# Patient Record
Sex: MALE | NOT HISPANIC OR LATINO
[De-identification: names, ages, dates, MRNs, and addresses within clinical notes are randomized per-mention and may not be internally consistent; named-entity substitution may affect disease eponyms.]

---

## 2021-05-21 PROBLEM — Z00.00 ENCOUNTER FOR PREVENTIVE HEALTH EXAMINATION: Status: ACTIVE | Noted: 2021-05-21

## 2021-05-27 ENCOUNTER — NON-APPOINTMENT (OUTPATIENT)
Age: 41
End: 2021-05-27

## 2021-05-27 ENCOUNTER — APPOINTMENT (OUTPATIENT)
Dept: GASTROENTEROLOGY | Facility: CLINIC | Age: 41
End: 2021-05-27
Payer: MEDICAID

## 2021-05-27 VITALS
OXYGEN SATURATION: 97 % | BODY MASS INDEX: 24.64 KG/M2 | HEART RATE: 109 BPM | DIASTOLIC BLOOD PRESSURE: 78 MMHG | RESPIRATION RATE: 15 BRPM | WEIGHT: 192 LBS | HEIGHT: 74 IN | TEMPERATURE: 97.2 F | SYSTOLIC BLOOD PRESSURE: 109 MMHG

## 2021-05-27 DIAGNOSIS — K64.9 UNSPECIFIED HEMORRHOIDS: ICD-10-CM

## 2021-05-27 PROCEDURE — 99205 OFFICE O/P NEW HI 60 MIN: CPT

## 2021-05-27 RX ORDER — HYDROCORTISONE ACETATE 25 MG/1
25 SUPPOSITORY RECTAL
Qty: 15 | Refills: 1 | Status: ACTIVE | COMMUNITY
Start: 2021-05-27 | End: 1900-01-01

## 2021-05-28 LAB
ALBUMIN SERPL ELPH-MCNC: 4.5 G/DL
ALP BLD-CCNC: 66 U/L
ALT SERPL-CCNC: 22 U/L
ANION GAP SERPL CALC-SCNC: 13 MMOL/L
AST SERPL-CCNC: 23 U/L
BASOPHILS # BLD AUTO: 0.02 K/UL
BASOPHILS NFR BLD AUTO: 0.3 %
BILIRUB SERPL-MCNC: 0.3 MG/DL
BUN SERPL-MCNC: 17 MG/DL
CALCIUM SERPL-MCNC: 9.7 MG/DL
CHLORIDE SERPL-SCNC: 108 MMOL/L
CO2 SERPL-SCNC: 22 MMOL/L
CREAT SERPL-MCNC: 1.2 MG/DL
EOSINOPHIL # BLD AUTO: 0.06 K/UL
EOSINOPHIL NFR BLD AUTO: 0.8 %
GLUCOSE SERPL-MCNC: 90 MG/DL
HCT VFR BLD CALC: 47.4 %
HGB BLD-MCNC: 15.4 G/DL
IMM GRANULOCYTES NFR BLD AUTO: 0.1 %
LYMPHOCYTES # BLD AUTO: 2.06 K/UL
LYMPHOCYTES NFR BLD AUTO: 26.5 %
MAN DIFF?: NORMAL
MCHC RBC-ENTMCNC: 29.1 PG
MCHC RBC-ENTMCNC: 32.5 GM/DL
MCV RBC AUTO: 89.6 FL
MONOCYTES # BLD AUTO: 0.56 K/UL
MONOCYTES NFR BLD AUTO: 7.2 %
NEUTROPHILS # BLD AUTO: 5.07 K/UL
NEUTROPHILS NFR BLD AUTO: 65.1 %
PLATELET # BLD AUTO: 254 K/UL
POTASSIUM SERPL-SCNC: 4.6 MMOL/L
PROT SERPL-MCNC: 6.7 G/DL
RBC # BLD: 5.29 M/UL
RBC # FLD: 12.7 %
SODIUM SERPL-SCNC: 143 MMOL/L
WBC # FLD AUTO: 7.78 K/UL

## 2021-05-28 NOTE — PHYSICAL EXAM
[General Appearance - Alert] : alert [General Appearance - In No Acute Distress] : in no acute distress [General Appearance - Well Nourished] : well nourished [General Appearance - Well Developed] : well developed [General Appearance - Well-Appearing] : healthy appearing [Neck Appearance] : the appearance of the neck was normal [Abdomen Soft] : soft [Abdomen Tenderness] : non-tender [] : no hepato-splenomegaly [Skin Color & Pigmentation] : normal skin color and pigmentation [Oriented To Time, Place, And Person] : oriented to person, place, and time [Impaired Insight] : insight and judgment were intact [Affect] : the affect was normal [No Rectal Mass] : no rectal mass [FreeTextEntry1] : fleshy fullness c/w internal hemorrhoids.

## 2021-05-28 NOTE — HISTORY OF PRESENT ILLNESS
[de-identified] : 39 y/o male presents to clinic after he saw BRBPR (painless) on tissue and in the toilet on 5/20. He has had hx of some minor bright red blood on tissue paper previously as well but nothing like this episode last week. Patient also has had constipation in past which improved with high fiber diet and Metamucil. Patient mentions getting admitted from diarrhea (no blood) at [facility] in 2016 for colitis diagnosed on CT scan. Didn't undergo a colonoscopy then. \par \par Patient also complains of abdominal discomfort, bloating, belching which has been an ongoing complain for multiple years. Patient underwent EGD, colonoscopy at [facility] 10-15 years ago for abdominal discomfort/bloating which was negative. CT scan at that time was also normal.\par \par Family hx: Fathers uncle colon cancer at 70 yrs of age. Father and sister have lupus. \par Allergeis: PCN\par No smoking, alcohol\par Occasional MMJ.\par \par Is not COVID vaccinated yet. \par \par Very remote hx of anoreceptive intervcourse.

## 2021-05-28 NOTE — ASSESSMENT
[FreeTextEntry1] : 39 y/o male presents to clinic complaining of BRPRP episode last week which self resolved. \par \par # BRPRP\par Family hx of colon cancer in paternal uncle. \par Per patient EGD and colonoscopy 10-15 years ago was normal. \par Remote hx of acute colitis in 2016. Will get CT scan report from [facility]. \par Rectal exam didn't reveal a fissure or mass. \par Will need a colonoscopy to further evaluate colon. r/b/a/i\par Recommend high fiber diet to prevent constipation. \par Will prescribe Anucort-HC QHS for hemorrhoids. \par Check CBC, CMP\par \par # MMJ smoker\par Counseled that it can contribute to constipation\par \par Follow up 2 weeks after colonoscopy.

## 2021-05-28 NOTE — CONSULT LETTER
[Dear  ___] : Dear  [unfilled], [Consult Letter:] : I had the pleasure of evaluating your patient, [unfilled]. [Please see my note below.] : Please see my note below. [Consult Closing:] : Thank you very much for allowing me to participate in the care of this patient.  If you have any questions, please do not hesitate to contact me. [Sincerely,] : Sincerely, [FreeTextEntry3] : Vipin Mckeon MD\par Associate Professor of Medicine\par Director IBD Program\par [facility]\par

## 2021-06-28 ENCOUNTER — TRANSCRIPTION ENCOUNTER (OUTPATIENT)
Age: 41
End: 2021-06-28

## 2021-06-28 ENCOUNTER — NON-APPOINTMENT (OUTPATIENT)
Age: 41
End: 2021-06-28

## 2021-06-28 RX ORDER — POLYETHYLENE GLYCOL 3350 AND ELECTROLYTES WITH LEMON FLAVOR 236; 22.74; 6.74; 5.86; 2.97 G/4L; G/4L; G/4L; G/4L; G/4L
236 POWDER, FOR SOLUTION ORAL
Qty: 1 | Refills: 0 | Status: ACTIVE | COMMUNITY
Start: 2021-06-28 | End: 1900-01-01

## 2021-06-29 ENCOUNTER — APPOINTMENT (OUTPATIENT)
Dept: GASTROENTEROLOGY | Facility: HOSPITAL | Age: 41
End: 2021-06-29

## 2021-06-30 ENCOUNTER — TRANSCRIPTION ENCOUNTER (OUTPATIENT)
Age: 41
End: 2021-06-30

## 2021-07-01 ENCOUNTER — RESULT REVIEW (OUTPATIENT)
Age: 41
End: 2021-07-01

## 2021-07-01 PROCEDURE — 88305 TISSUE EXAM BY PATHOLOGIST: CPT | Mod: 26

## 2021-07-02 ENCOUNTER — APPOINTMENT (OUTPATIENT)
Dept: GASTROENTEROLOGY | Facility: HOSPITAL | Age: 41
End: 2021-07-02

## 2021-07-02 ENCOUNTER — OUTPATIENT (OUTPATIENT)
Dept: OUTPATIENT SERVICES | Facility: HOSPITAL | Age: 41
LOS: 1 days | Discharge: HOME | End: 2021-07-02
Payer: MEDICAID

## 2021-07-02 PROCEDURE — 45380 COLONOSCOPY AND BIOPSY: CPT

## 2021-07-02 PROCEDURE — 88305 TISSUE EXAM BY PATHOLOGIST: CPT

## 2021-07-06 LAB — SURGICAL PATHOLOGY STUDY: SIGNIFICANT CHANGE UP

## 2021-07-21 ENCOUNTER — APPOINTMENT (OUTPATIENT)
Dept: GASTROENTEROLOGY | Facility: CLINIC | Age: 41
End: 2021-07-21
Payer: MEDICAID

## 2021-07-21 DIAGNOSIS — K59.00 CONSTIPATION, UNSPECIFIED: ICD-10-CM

## 2021-07-21 DIAGNOSIS — K62.5 HEMORRHAGE OF ANUS AND RECTUM: ICD-10-CM

## 2021-07-21 PROCEDURE — 99212 OFFICE O/P EST SF 10 MIN: CPT | Mod: 95

## 2021-07-22 PROBLEM — K59.00 CONSTIPATION: Status: ACTIVE | Noted: 2021-07-22

## 2021-07-22 NOTE — ASSESSMENT
[FreeTextEntry1] : 39 y/o male s/p Colonoscopy for BRBPR\par Colonoscopy was negative for any active bleeding, masses, suspect source of IH\par No further episodes, has started including more fibre in diet\par \par Plan\par - Monitor for any further episodes for bleeding \par - Can use Anucort-HC as needed\par - Colon Cancer screening Colonoscopy at age 46\par - Continue with high fibre diet\par \par Follow up PRN

## 2021-07-22 NOTE — CONSULT LETTER
[Dear  ___] : Dear  [unfilled], [Courtesy Letter:] : I had the pleasure of seeing your patient, [unfilled], in my office today. [Please see my note below.] : Please see my note below. [Sincerely,] : Sincerely, [FreeTextEntry3] : Vipin Mckeon MD\par Associate Professor of Medicine\par Director IBD Program\par [facility]\par

## 2021-07-22 NOTE — HISTORY OF PRESENT ILLNESS
[de-identified] : 41 y/o male initially presented for BRBPR (painless), for a follow up visit  today s/p Colonoscopy. \par \par Noted bright red blood in stools in 5/21, previously had noted blood on tissue paper. \par History also significant for constipation in past which improved with high fiber diet and Metamucil. Patient mentions getting admitted from diarrhea (no blood) at [facility] in 2016 for colitis diagnosed on CT scan. Didn't undergo a colonoscopy then. \par \par Patient also complains of abdominal discomfort, bloating, belching which has been an ongoing complain for multiple years. Patient underwent EGD, colonoscopy at [facility] 10-15 years ago for abdominal discomfort/bloating which was negative. CT scan at that time was also normal.\par \par Family hx: Fathers uncle colon cancer at 70 yrs of age. Father and sister have lupus. \par Allergeis: PCN\par No smoking, alcohol\par Occasional MMJ.\par \par Very remote hx of anoreceptive intervcourse. \par \par S/p Colonoscopy \par Negative for any masses/bleeding \par Hyperplastic sigmoid polyp \par Random biopsy samples were wnl\par \par Had not noticed any blood anymore, was prescribed Anusol but has not used it. \par Reports that he has increased fibre intake in his diet.  [de-identified] : Bleeding per rectum - resolved

## 2021-10-06 PROBLEM — K62.5 BLEEDING PER RECTUM: Status: ACTIVE | Noted: 2021-07-22
